# Patient Record
Sex: FEMALE | Race: ASIAN | Employment: FULL TIME | URBAN - METROPOLITAN AREA
[De-identification: names, ages, dates, MRNs, and addresses within clinical notes are randomized per-mention and may not be internally consistent; named-entity substitution may affect disease eponyms.]

---

## 2021-05-17 ENCOUNTER — OFFICE VISIT (OUTPATIENT)
Dept: URGENT CARE | Facility: URGENT CARE | Age: 31
End: 2021-05-17
Payer: COMMERCIAL

## 2021-05-17 VITALS
BODY MASS INDEX: 36.82 KG/M2 | HEART RATE: 94 BPM | DIASTOLIC BLOOD PRESSURE: 88 MMHG | TEMPERATURE: 98.9 F | WEIGHT: 195 LBS | OXYGEN SATURATION: 97 % | SYSTOLIC BLOOD PRESSURE: 138 MMHG | HEIGHT: 61 IN

## 2021-05-17 DIAGNOSIS — N92.1 MENORRHAGIA WITH IRREGULAR CYCLE: ICD-10-CM

## 2021-05-17 DIAGNOSIS — N93.9 VAGINAL BLEEDING: Primary | ICD-10-CM

## 2021-05-17 LAB
BASOPHILS # BLD AUTO: 0 10E9/L (ref 0–0.2)
BASOPHILS NFR BLD AUTO: 0.4 %
DIFFERENTIAL METHOD BLD: ABNORMAL
EOSINOPHIL # BLD AUTO: 0 10E9/L (ref 0–0.7)
EOSINOPHIL NFR BLD AUTO: 0.5 %
ERYTHROCYTE [DISTWIDTH] IN BLOOD BY AUTOMATED COUNT: 13.1 % (ref 10–15)
HCG UR QL: NEGATIVE
HCT VFR BLD AUTO: 38.5 % (ref 35–47)
HGB BLD-MCNC: 12.5 G/DL (ref 11.7–15.7)
INR PPP: 1 (ref 0.86–1.14)
LYMPHOCYTES # BLD AUTO: 2.6 10E9/L (ref 0.8–5.3)
LYMPHOCYTES NFR BLD AUTO: 32 %
MCH RBC QN AUTO: 26.1 PG (ref 26.5–33)
MCHC RBC AUTO-ENTMCNC: 32.5 G/DL (ref 31.5–36.5)
MCV RBC AUTO: 80 FL (ref 78–100)
MONOCYTES # BLD AUTO: 0.6 10E9/L (ref 0–1.3)
MONOCYTES NFR BLD AUTO: 7.3 %
NEUTROPHILS # BLD AUTO: 4.9 10E9/L (ref 1.6–8.3)
NEUTROPHILS NFR BLD AUTO: 59.8 %
PLATELET # BLD AUTO: 316 10E9/L (ref 150–450)
RBC # BLD AUTO: 4.79 10E12/L (ref 3.8–5.2)
WBC # BLD AUTO: 8.2 10E9/L (ref 4–11)

## 2021-05-17 PROCEDURE — 36415 COLL VENOUS BLD VENIPUNCTURE: CPT | Performed by: PHYSICIAN ASSISTANT

## 2021-05-17 PROCEDURE — 85610 PROTHROMBIN TIME: CPT | Performed by: PHYSICIAN ASSISTANT

## 2021-05-17 PROCEDURE — 85025 COMPLETE CBC W/AUTO DIFF WBC: CPT | Performed by: PHYSICIAN ASSISTANT

## 2021-05-17 PROCEDURE — 81025 URINE PREGNANCY TEST: CPT | Performed by: PHYSICIAN ASSISTANT

## 2021-05-17 PROCEDURE — 99203 OFFICE O/P NEW LOW 30 MIN: CPT | Performed by: PHYSICIAN ASSISTANT

## 2021-05-17 ASSESSMENT — ENCOUNTER SYMPTOMS
LIGHT-HEADEDNESS: 0
DIZZINESS: 0

## 2021-05-17 ASSESSMENT — MIFFLIN-ST. JEOR: SCORE: 1541.89

## 2021-05-17 NOTE — PROGRESS NOTES
"SUBJECTIVE:   Maxime Stoner is a 30 year old female presenting with a chief complaint of   Chief Complaint   Patient presents with     Urgent Care     Vaginal Bleeding     started yesterday, heavy bleeding but has IUD       She is a new patient of [facility].  Patient presents with complaints of unusually heavy vaginally bleeding with IUD.  Placed in 2017 uneventfully.  Patient does have intermittent spotting.  Bleeding since yesterday morning.  Cramping and and bright red blood and cramping worsened.   Patient on 4th pad, not saturated.  Patient has been on BCP in past and had bad reactions from it - even tried patches.      No treatment attempted.      PMHx:  None  Medications:  None  Surgeries:  None    Social:  None - social etoh.  No allergies.      Review of Systems   Genitourinary: Positive for vaginal bleeding.   Neurological: Negative for dizziness and light-headedness.   All other systems reviewed and are negative.      No past medical history on file.  No family history on file.  Current Outpatient Medications   Medication Sig Dispense Refill     levonorgestrel (MIRENA) 20 MCG/24HR IUD 1 each by Intrauterine route once       Social History     Tobacco Use     Smoking status: Never Smoker     Smokeless tobacco: Never Used   Substance Use Topics     Alcohol use: Not on file       OBJECTIVE  /88   Pulse 94   Temp 98.9  F (37.2  C) (Oral)   Ht 1.549 m (5' 1\")   Wt 88.5 kg (195 lb)   SpO2 97%   BMI 36.84 kg/m      Physical Exam  Vitals signs and nursing note reviewed.   Constitutional:       General: She is not in acute distress.     Appearance: Normal appearance. She is obese. She is not ill-appearing, toxic-appearing or diaphoretic.   Eyes:      Extraocular Movements: Extraocular movements intact.      Conjunctiva/sclera: Conjunctivae normal.   Cardiovascular:      Rate and Rhythm: Normal rate and regular rhythm.      Pulses: Normal pulses.      Heart sounds: Normal heart sounds.   Pulmonary:      " Effort: Pulmonary effort is normal.      Breath sounds: Normal breath sounds.   Skin:     General: Skin is warm and dry.   Neurological:      General: No focal deficit present.      Mental Status: She is alert.   Psychiatric:         Mood and Affect: Mood normal.         Behavior: Behavior normal.         Labs:  Results for orders placed or performed in visit on 05/17/21 (from the past 24 hour(s))   HCG Qual, Urine ([id number])   Result Value Ref Range    HCG Qual Urine Negative NEG^Negative   CBC with platelets and differential   Result Value Ref Range    WBC 8.2 4.0 - 11.0 10e9/L    RBC Count 4.79 3.8 - 5.2 10e12/L    Hemoglobin 12.5 11.7 - 15.7 g/dL    Hematocrit 38.5 35.0 - 47.0 %    MCV 80 78 - 100 fl    MCH 26.1 (L) 26.5 - 33.0 pg    MCHC 32.5 31.5 - 36.5 g/dL    RDW 13.1 10.0 - 15.0 %    Platelet Count 316 150 - 450 10e9/L    % Neutrophils 59.8 %    % Lymphocytes 32.0 %    % Monocytes 7.3 %    % Eosinophils 0.5 %    % Basophils 0.4 %    Absolute Neutrophil 4.9 1.6 - 8.3 10e9/L    Absolute Lymphocytes 2.6 0.8 - 5.3 10e9/L    Absolute Monocytes 0.6 0.0 - 1.3 10e9/L    Absolute Eosinophils 0.0 0.0 - 0.7 10e9/L    Absolute Basophils 0.0 0.0 - 0.2 10e9/L    Diff Method Automated Method    INR   Result Value Ref Range    INR 1.00 0.86 - 1.14       X-Ray was not done.    ASSESSMENT:      ICD-10-CM    1. Vaginal bleeding  N93.9 HCG Qual, Urine ([id number])     CBC with platelets and differential     INR   2. Menorrhagia with irregular cycle  N92.1         Medical Decision Making:    Differential Diagnosis:  Pregnancy, IUD SE    Serious Comorbid Conditions:  Adult:  None    PLAN:    Patient education.  Discussed options.  Patient opted to go back to [facility] for management.    Followup:    If not improving or if condition worsens, follow up with your Primary Care Provider, If not improving or if conditions worsens over the next 12-24 hours, go to the Emergency Department    Patient Instructions     Patient  Education     Understanding Uterine Bleeding  Your uterine bleeding may be heavy. Or you may have bleeding between periods. These problems may be caused by hormonal imbalance. Or they can be caused by uterine growths, an intrauterine device (IUD), bleeding disorder, or pregnancy.  Hormonal imbalance  Your menstrual cycle is controlled by hormones. The hormones include estrogen and progesterone. Sometimes there is too much or too little of one or both of these hormones, causing an imbalance. This can cause heavy periods. Or it can cause bleeding between periods. Causes of hormonal imbalance can include:    Hormonal changes in teens and in women nearing menopause    Diabetes, thyroid disease, or other medical problems    Obesity    Stress    Strenuous exercise    Anorexia, an eating disorder    Pregnancy  Uterine growths  There are different kinds of uterine growths. These include:    Fibroids. These are round knots of noncancer (benign) muscle tissue in the uterus.    Polyps. These are soft tissue growths in the uterine lining. They often extend into the uterus.    Adenomyosis. This is when the uterine lining grows into the muscle wall.    Hyperplasia. This is when the uterine lining gets too thick or grows too much.    Endometrial cancer. This is uncontrolled growth of part of the uterine lining.  Other causes of uterine bleeding  There are other causes of uterine bleeding. These include:    IUD (intrauterine device). This is a method of birth control. Some IUDs contain hormones.    Bleeding disorders. This is when the blood can't clot properly.  Treatment  Your healthcare provider can help diagnose the cause of your bleeding problem. He or she will work with you to plan treatment as needed.  Jose last reviewed this educational content on 5/1/2020 2000-2021 [employer]. All rights reserved. This information is not intended as a substitute for professional medical care. Always follow your  healthcare professional's instructions.

## 2021-05-17 NOTE — PATIENT INSTRUCTIONS
Patient Education     Understanding Uterine Bleeding  Your uterine bleeding may be heavy. Or you may have bleeding between periods. These problems may be caused by hormonal imbalance. Or they can be caused by uterine growths, an intrauterine device (IUD), bleeding disorder, or pregnancy.  Hormonal imbalance  Your menstrual cycle is controlled by hormones. The hormones include estrogen and progesterone. Sometimes there is too much or too little of one or both of these hormones, causing an imbalance. This can cause heavy periods. Or it can cause bleeding between periods. Causes of hormonal imbalance can include:    Hormonal changes in teens and in women nearing menopause    Diabetes, thyroid disease, or other medical problems    Obesity    Stress    Strenuous exercise    Anorexia, an eating disorder    Pregnancy  Uterine growths  There are different kinds of uterine growths. These include:    Fibroids. These are round knots of noncancer (benign) muscle tissue in the uterus.    Polyps. These are soft tissue growths in the uterine lining. They often extend into the uterus.    Adenomyosis. This is when the uterine lining grows into the muscle wall.    Hyperplasia. This is when the uterine lining gets too thick or grows too much.    Endometrial cancer. This is uncontrolled growth of part of the uterine lining.  Other causes of uterine bleeding  There are other causes of uterine bleeding. These include:    IUD (intrauterine device). This is a method of birth control. Some IUDs contain hormones.    Bleeding disorders. This is when the blood can't clot properly.  Treatment  Your healthcare provider can help diagnose the cause of your bleeding problem. He or she will work with you to plan treatment as needed.  Jose last reviewed this educational content on 5/1/2020 2000-2021 [employer]. All rights reserved. This information is not intended as a substitute for professional medical care. Always follow  your healthcare professional's instructions.